# Patient Record
Sex: FEMALE | Race: WHITE
[De-identification: names, ages, dates, MRNs, and addresses within clinical notes are randomized per-mention and may not be internally consistent; named-entity substitution may affect disease eponyms.]

---

## 2018-07-11 ENCOUNTER — HOSPITAL ENCOUNTER (OUTPATIENT)
Dept: HOSPITAL 53 - M OPP | Age: 53
Discharge: HOME | End: 2018-07-11
Attending: SURGERY
Payer: COMMERCIAL

## 2018-07-11 DIAGNOSIS — K64.1: ICD-10-CM

## 2018-07-11 DIAGNOSIS — Z79.899: ICD-10-CM

## 2018-07-11 DIAGNOSIS — Z98.51: ICD-10-CM

## 2018-07-11 DIAGNOSIS — Z91.013: ICD-10-CM

## 2018-07-11 DIAGNOSIS — Z12.11: Primary | ICD-10-CM

## 2018-07-11 DIAGNOSIS — K21.9: ICD-10-CM

## 2018-07-11 DIAGNOSIS — Z88.8: ICD-10-CM

## 2018-07-11 DIAGNOSIS — E78.5: ICD-10-CM

## 2018-07-11 DIAGNOSIS — I10: ICD-10-CM

## 2018-07-11 DIAGNOSIS — R00.8: ICD-10-CM

## 2018-07-11 DIAGNOSIS — E03.9: ICD-10-CM

## 2018-07-11 DIAGNOSIS — F41.9: ICD-10-CM

## 2018-07-11 DIAGNOSIS — Z87.891: ICD-10-CM

## 2018-07-11 DIAGNOSIS — D64.9: ICD-10-CM

## 2018-07-11 DIAGNOSIS — K57.30: ICD-10-CM

## 2018-07-11 PROCEDURE — 45378 DIAGNOSTIC COLONOSCOPY: CPT

## 2018-07-11 RX ADMIN — SODIUM CHLORIDE 1 MLS/HR: 9 INJECTION, SOLUTION INTRAVENOUS at 10:45

## 2018-07-25 ENCOUNTER — HOSPITAL ENCOUNTER (OUTPATIENT)
Dept: HOSPITAL 53 - M SLEEP | Age: 53
End: 2018-07-25
Attending: NURSE PRACTITIONER
Payer: COMMERCIAL

## 2018-07-25 DIAGNOSIS — G47.30: Primary | ICD-10-CM

## 2018-07-25 PROCEDURE — 95810 POLYSOM 6/> YRS 4/> PARAM: CPT

## 2019-08-27 ENCOUNTER — HOSPITAL ENCOUNTER (OUTPATIENT)
Dept: HOSPITAL 53 - M RAD | Age: 54
End: 2019-08-27
Attending: OTOLARYNGOLOGY
Payer: COMMERCIAL

## 2019-08-27 DIAGNOSIS — R51: Primary | ICD-10-CM

## 2019-08-27 NOTE — REP
CT of the maxillofacial bones without contrast

 

Indication:  Headache.

 

Comparison:  None

 

Technique:  Axial CT of the maxillofacial bones were performed without contrast.

 

Findings:

 

There are bilateral agger nasi cells and a small infraorbital ethmoid air cell on

the left.  There is variant anatomy of the anterior ethmoidal canals.

 

Frontal sinuses:  Clear bilaterally.

 

Ethmoid air cells:  Clear bilaterally.

 

Sphenoid sinuses:  Clear bilaterally.  The sphenoid sinus septum inserts on the

left.

 

Maxillary sinuses:  Clear bilaterally. The ostiomeatal units appear narrowed

bilaterally by variant ethmoid air cells.  There is no mucosal thickening.

 

Nasal passages and septum:  Normal appearance of the nasal turbinates.  The nasal

septum is midline. Note is made of nasal jewelry.

 

Mastoid air cells:  Clear bilaterally.

 

The cribriform plate and fovea ethmoidalis are intact.

 

Impression:

There are bilateral agger nasi cells and a small infraorbital ethmoid air cell on

the left.  There is variant anatomy of the anterior ethmoidal canals. The

ostiomeatal units appear narrowed bilaterally by variant ethmoid air cells. No

mucosal thickening within the sinuses.  No opacification of the mastoid air

cells.

 

 

 

 

Electronically Signed by

Florian Chaves MD 08/27/2019 01:57 P